# Patient Record
Sex: FEMALE | Race: WHITE | NOT HISPANIC OR LATINO
[De-identification: names, ages, dates, MRNs, and addresses within clinical notes are randomized per-mention and may not be internally consistent; named-entity substitution may affect disease eponyms.]

---
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
## 2022-06-06 ENCOUNTER — LABORATORY RESULT (OUTPATIENT)
Age: 22
End: 2022-06-06

## 2022-06-09 ENCOUNTER — NON-APPOINTMENT (OUTPATIENT)
Age: 22
End: 2022-06-09

## 2022-06-09 LAB
DEPRECATED PEANUT IGE RAST QL: 4
PEANUT (RARA H) 1 IGE QN: 13.4 KUA/L
PEANUT (RARA H) 2 IGE QN: 16.9 KUA/L
PEANUT (RARA H) 3 IGE QN: 1.74 KUA/L
PEANUT (RARA H) 6 IGE QN: 17.1 KUA/L
PEANUT (RARA H) 8 IGE QN: <0.1 KUA/L
PEANUT (RARA H) 9 IGE QN: <0.1 KUA/L
PEANUT IGE QN: 33.7 KUA/L
RARA H 6 STORAGE PROTEIN (F447) CLASS: 3
RARA H1 STORAGE PROTEIN (F422) CLASS: 3
RARA H2 STORAGE PROTEIN (F423) CLASS: 3
RARA H3 STORAGE PROTEIN (F424) CLASS: 2
RARA H8 PR-10 PROTEIN (F352) CLASS: 0
RARA H9 LIPID TRANSFERTP (F427) CLASS: 0

## 2022-06-20 ENCOUNTER — APPOINTMENT (OUTPATIENT)
Dept: PEDIATRIC ALLERGY IMMUNOLOGY | Facility: CLINIC | Age: 22
End: 2022-06-20
Payer: COMMERCIAL

## 2022-06-20 VITALS
DIASTOLIC BLOOD PRESSURE: 61 MMHG | BODY MASS INDEX: 17.9 KG/M2 | HEART RATE: 76 BPM | HEIGHT: 59.5 IN | WEIGHT: 90 LBS | OXYGEN SATURATION: 98 % | SYSTOLIC BLOOD PRESSURE: 92 MMHG

## 2022-06-20 PROCEDURE — 99213 OFFICE O/P EST LOW 20 MIN: CPT

## 2022-06-20 RX ORDER — DEXAMETHASONE 4 MG/1
TABLET ORAL
Refills: 0 | Status: COMPLETED | COMMUNITY
End: 2022-06-20

## 2022-06-20 RX ORDER — CEPHALEXIN 250 MG/1
250 CAPSULE ORAL
Qty: 120 | Refills: 0 | Status: ACTIVE | COMMUNITY
Start: 2021-12-22

## 2022-06-20 RX ORDER — CLINDAMYCIN PHOSPHATE 10 MG/ML
1 LOTION TOPICAL
Qty: 60 | Refills: 0 | Status: ACTIVE | COMMUNITY
Start: 2021-07-07

## 2022-06-20 NOTE — SOCIAL HISTORY
[FreeTextEntry1] : Patient will attend [employer] in fall - will live on dorm on campus.

## 2022-06-20 NOTE — HISTORY OF PRESENT ILLNESS
[de-identified] : 21 yr old last seen 7/1/21 with known reaction reaction to peanut now avoiding all peanuts. OK with all tree nuts, sesame, legumes. Pt going to [employer], has been living there on campus and eating in dining halls and has done very well with peanut avoidance. Review of current Immunocaps show persistence of sIgE to peanut and Sheryl h2 elevated enough to suggested continual sensitization - not a candidate for challenge. \par Mild AR noted this spring - used Claritin PRN which helped. \par \par Patient now back summer 2022 and continues to avoid PN(ok with TN) with no accidental ingestion. Refill needed on EpiPen. Recent ImmunoCAP back 6/6/2022:\par Peanut - Decreased at 33.70 from 67.60 in 2021\par Sheryl h1- Decreased at 13.40 from 21.70 in 2021\par Sheryl h2- Decreased at 16.90 from 37.40 in 2021\par Sheryl h3- Decreased at 1.74 from 3.19 in 2021\par Sheryl h6- Decreased at 17.10 from 41.00 in 2021\par Sheryl h8- Still negative\par Sheryl h9- Still negative\par \par Patient also has mild AR symptoms in the spring and with cat/dog exposure. She normally does nothing for her seasonal symptoms but pre-treats with Claritin 10mg 1 tab before animal exposure.\par \par

## 2022-06-20 NOTE — ASSESSMENT
[FreeTextEntry1] : 21 y.o female with known reaction to PN allergy and mild AR - OK with TN\par \par Based off recent total PN IgE and Sheryl h2 patient should continue to avoid and carry EpiPen which was refilled\par \par Discussed that ingestion of foods cooked(fried) at a high temp in peanut oil is normally well tolerated. Should be careful that PN oil flavoring drizzled on top is not ok and should be avoided\par \par Follow-up in one year\par \par Patient was seen with RICHARD Lacey\par \par Total MD time spent on this encounter was 25 minutes.  This includes time devoted to preparing to see the patient with review of previous medical record, obtaining medical history, performing physical exam, counseling and patient education with patient and family, ordering medications and lab studies, documentation in the medical record and coordination of care.\par \par

## 2022-06-20 NOTE — PHYSICAL EXAM
[Alert] : alert [Well Nourished] : well nourished [Healthy Appearance] : healthy appearance [No Acute Distress] : no acute distress [Well Developed] : well developed [Normal Pupil & Iris Size/Symmetry] : normal pupil and iris size and symmetry [No Discharge] : no discharge [No Photophobia] : no photophobia [Sclera Not Icteric] : sclera not icteric [Normal TMs] : both tympanic membranes were normal [Normal Nasal Mucosa] : the nasal mucosa was normal [Normal Lips/Tongue] : the lips and tongue were normal [Normal Outer Ear/Nose] : the ears and nose were normal in appearance [No Nasal Discharge] : no nasal discharge [Normal Tonsils] : normal tonsils [No Thrush] : no thrush [No Neck Mass] : no neck mass was observed [Normal Rate and Effort] : normal respiratory rhythm and effort [Bilateral Audible Breath Sounds] : bilateral audible breath sounds [Normal Rate] : heart rate was normal  [Normal S1, S2] : normal S1 and S2 [Normal Cervical Lymph Nodes] : cervical [Skin Intact] : skin intact  [No Rash] : no rash [Normal Mood] : mood was normal [Normal Affect] : affect was normal

## 2022-06-20 NOTE — REASON FOR VISIT
[Routine Follow-Up] : a routine follow-up visit for [Allergy Evaluation/ Skin Testing] : allergy evaluation and or skin testing [To Food] : allergy to food [Parent] : parent

## 2023-08-12 ENCOUNTER — LABORATORY RESULT (OUTPATIENT)
Age: 23
End: 2023-08-12

## 2023-08-19 ENCOUNTER — NON-APPOINTMENT (OUTPATIENT)
Age: 23
End: 2023-08-19

## 2023-08-19 LAB
DEPRECATED PEANUT IGE RAST QL: 4
PEANUT (RARA H) 1 IGE QN: 12.5 KUA/L
PEANUT (RARA H) 2 IGE QN: 11.8 KUA/L
PEANUT (RARA H) 3 IGE QN: 1.33 KUA/L
PEANUT (RARA H) 6 IGE QN: 12.8 KUA/L
PEANUT (RARA H) 8 IGE QN: <0.1 KUA/L
PEANUT (RARA H) 9 IGE QN: 0.11 KUA/L
PEANUT IGE QN: 31.6 KUA/L
RARA H 6 STORAGE PROTEIN (F447) CLASS: 3
RARA H1 STORAGE PROTEIN (F422) CLASS: 3
RARA H2 STORAGE PROTEIN (F423) CLASS: 3
RARA H3 STORAGE PROTEIN (F424) CLASS: 2
RARA H8 PR-10 PROTEIN (F352) CLASS: 0
RARA H9 LIPID TRANSFERTP (F427) CLASS: ABNORMAL

## 2023-08-23 ENCOUNTER — APPOINTMENT (OUTPATIENT)
Dept: PEDIATRIC ALLERGY IMMUNOLOGY | Facility: CLINIC | Age: 23
End: 2023-08-23
Payer: COMMERCIAL

## 2023-08-23 VITALS
WEIGHT: 95 LBS | OXYGEN SATURATION: 99 % | DIASTOLIC BLOOD PRESSURE: 64 MMHG | SYSTOLIC BLOOD PRESSURE: 102 MMHG | HEART RATE: 76 BPM

## 2023-08-23 DIAGNOSIS — L70.9 ACNE, UNSPECIFIED: ICD-10-CM

## 2023-08-23 DIAGNOSIS — J30.9 ALLERGIC RHINITIS, UNSPECIFIED: ICD-10-CM

## 2023-08-23 DIAGNOSIS — Z91.010 ALLERGY TO PEANUTS: ICD-10-CM

## 2023-08-23 PROCEDURE — 99213 OFFICE O/P EST LOW 20 MIN: CPT

## 2023-08-23 NOTE — HISTORY OF PRESENT ILLNESS
[de-identified] : 22 yr old last seen 6/22 - known reaction to peanut now avoiding all peanuts. OK with all tree nuts, sesame, legumes. Pt going to [employer], has been living there on campus and eating in dining halls and has done very well with peanut avoidance.  Mild AR noted this spring - used Claritin PRN which helped.   New Immunocaps -  PN - no change at 31.6 from 33 with Sheryl h2 down from 16.9 to 11.8 - avoid No accidents Still remains on Keflex 250-500 mg qd for acne suppression

## 2023-08-23 NOTE — ASSESSMENT
[FreeTextEntry1] : 22 yr old wit history of known reaction to PN - OK with TN Immunocaps too high to challenge Discuss continual avoidance Discuss OIT Discuss future treatments for food allergy Will refill EpiPen  Total MD time spent on this encounter was 25 minutes.  This includes time devoted to preparing to see the patient with review of previous medical record, obtaining medical history, performing physical exam, counseling and patient education with patient and family, ordering medications and lab studies, documentation in the medical record and coordination of care.

## 2023-08-23 NOTE — PHYSICAL EXAM
[Alert] : alert [Conjunctival Erythema] : no conjunctival erythema [Normal TMs] : both tympanic membranes were normal [Boggy Nasal Turbinates] : no boggy and/or pale nasal turbinates [Posterior Pharyngeal Cobblestoning] : no posterior pharyngeal cobblestoning [Normal Rate and Effort] : normal respiratory rhythm and effort [Wheezing] : no wheezing was heard [Normal Rate] : heart rate was normal  [Normal Cervical Lymph Nodes] : cervical [Skin Intact] : skin intact  [Patches] : no patches

## 2024-01-23 RX ORDER — EPINEPHRINE 0.3 MG/.3ML
0.3 INJECTION INTRAMUSCULAR
Qty: 2 | Refills: 3 | Status: ACTIVE | COMMUNITY
Start: 2021-03-22 | End: 1900-01-01

## 2024-08-17 ENCOUNTER — LABORATORY RESULT (OUTPATIENT)
Age: 24
End: 2024-08-17

## 2024-08-21 ENCOUNTER — NON-APPOINTMENT (OUTPATIENT)
Age: 24
End: 2024-08-21

## 2024-09-03 ENCOUNTER — APPOINTMENT (OUTPATIENT)
Dept: PEDIATRIC ALLERGY IMMUNOLOGY | Facility: CLINIC | Age: 24
End: 2024-09-03
Payer: COMMERCIAL

## 2024-09-03 VITALS
SYSTOLIC BLOOD PRESSURE: 96 MMHG | HEART RATE: 82 BPM | WEIGHT: 90 LBS | OXYGEN SATURATION: 97 % | DIASTOLIC BLOOD PRESSURE: 62 MMHG

## 2024-09-03 DIAGNOSIS — Z91.010 ALLERGY TO PEANUTS: ICD-10-CM

## 2024-09-03 PROCEDURE — 99213 OFFICE O/P EST LOW 20 MIN: CPT

## 2024-09-03 RX ORDER — AZELAIC ACID 0.15 G/G
GEL TOPICAL
Refills: 0 | Status: ACTIVE | COMMUNITY

## 2024-09-03 NOTE — REASON FOR VISIT
[Routine Follow-Up] : a routine follow-up visit for [Allergic Rhinitis] : allergic rhinitis [To Food] : allergy to food [Parent] : parent

## 2024-09-03 NOTE — PHYSICAL EXAM
[Alert] : alert [Conjunctival Erythema] : no conjunctival erythema [Pale mucosa] : no pale mucosa [Pharyngeal erythema] : no pharyngeal erythema [Clear Rhinorrhea] : no clear rhinorrhea was seen [No Neck Mass] : no neck mass was observed [Wheezing] : no wheezing was heard [Normal Rate] : heart rate was normal

## 2024-09-03 NOTE — ASSESSMENT
[FreeTextEntry1] : 23 yr old with peanut allergy Reviewed al Immunocaps  Discuss use of Xolair with paient - at this point no real interested Suggest repeat studies in one year

## 2024-09-03 NOTE — HISTORY OF PRESENT ILLNESS
[de-identified] : 23 yr old last seen 8/23 -  known reaction to peanut now avoiding all peanuts. OK with all tree nuts, sesame, legumes. Pt going to [employer], has been living there on campus and eating in dining halls and has done very well with peanut avoidance.  Mild AR noted this spring - used Claritin PRN which helped.   New Immunocaps -  PN - no change at 41 with incraese of Sheryl h2 to 21

## 2024-09-03 NOTE — HISTORY OF PRESENT ILLNESS
[de-identified] : 23 yr old last seen 8/23 -  known reaction to peanut now avoiding all peanuts. OK with all tree nuts, sesame, legumes. Pt going to [employer] [facility], has been living there on campus and eating in dining halls and has done very well with peanut avoidance.  Mild AR noted this spring - used Claritin PRN which helped.   New Immunocaps -  PN - no change at 41 with incraese of Sheryl h2 to 21